# Patient Record
Sex: MALE | Race: WHITE | RURAL
[De-identification: names, ages, dates, MRNs, and addresses within clinical notes are randomized per-mention and may not be internally consistent; named-entity substitution may affect disease eponyms.]

---

## 2020-06-23 ENCOUNTER — OFFICE VISIT (OUTPATIENT)
Dept: RURAL MEDICAL CENTER 4 | Facility: MEDICAL CENTER | Age: 60
End: 2020-06-23
Payer: MEDICARE

## 2020-06-23 DIAGNOSIS — K31.7 BENIGN GASTRIC POLYP: ICD-10-CM

## 2020-06-23 DIAGNOSIS — K64.8 BLEEDING INTERNAL HEMORRHOIDS: ICD-10-CM

## 2020-06-23 DIAGNOSIS — K29.40 ATROPHIC GASTRITIS: ICD-10-CM

## 2020-06-23 PROCEDURE — 43239 EGD BIOPSY SINGLE/MULTIPLE: CPT | Performed by: INTERNAL MEDICINE

## 2020-06-23 PROCEDURE — 45350 SGMDSC W/BAND LIGATION: CPT | Performed by: INTERNAL MEDICINE

## 2020-08-27 ENCOUNTER — OFFICE VISIT (OUTPATIENT)
Dept: URBAN - METROPOLITAN AREA CLINIC 54 | Facility: CLINIC | Age: 60
End: 2020-08-27
Payer: MEDICARE

## 2020-08-27 DIAGNOSIS — K21.9 GERD: ICD-10-CM

## 2020-08-27 DIAGNOSIS — K64.9 HEMORRHOIDS: ICD-10-CM

## 2020-08-27 PROCEDURE — 99214 OFFICE O/P EST MOD 30 MIN: CPT | Performed by: INTERNAL MEDICINE

## 2020-08-27 RX ORDER — HYDROCORTISONE ACETATE 25 MG/1
1 SUPPOSITORY SUPPOSITORY RECTAL TWICE A DAY
Qty: 28 | Refills: 0 | OUTPATIENT
Start: 2020-08-27 | End: 2020-09-10

## 2020-08-27 RX ORDER — PANTOPRAZOLE SODIUM 40 MG/1
TAKE 1 TABLET (40 MG) BY ORAL ROUTE ONCE DAILY TABLET, DELAYED RELEASE ORAL 1
Qty: 90 | Refills: 3 | Status: ACTIVE | COMMUNITY
Start: 2020-04-24

## 2020-08-27 NOTE — HPI-TODAY'S VISIT:
This is a follow-up appointment for this patient, a 60 year old /White male, after a previous visit on 01/31/2019 , for an evaluation for follow-up. The patient has history of chronic gastroesophageal reflux disease. Several months ago he was starting to have some chest pains. He was admitted to [facility]. Apparently he had a cardiac catheterization. I do not have the results of that. He tells me that it was all okay and there were no blockages. He has been on PPI for quite some time. He is currently on omeprazole 20 mg daily. His thought that his GI tract may be responsible for some of his chest pains. He also has history of hematochezia related to outlet type bleeding from hemorrhoids. He has had banding on several occasions and says that he still intermittently having hematochezia.   Follow Up 8/27/20: Patient presents for routine follow up. He saw Dr. Atkinson in April 2020 and had another band placed on LL column. He was previously banded x 3 in 2018/2019. He continues to have hemorrhoidal symptoms despite banding x 4 . He is bothered by itching, burning and occasional bleeding. No use of NSAID's or blood thinners. EGD showed non-erosive esophagus and small hiatal hernia. He is an avid  which aggravates his symptoms.

## 2020-08-31 ENCOUNTER — TELEPHONE ENCOUNTER (OUTPATIENT)
Dept: URBAN - METROPOLITAN AREA CLINIC 54 | Facility: CLINIC | Age: 60
End: 2020-08-31

## 2020-09-01 ENCOUNTER — TELEPHONE ENCOUNTER (OUTPATIENT)
Dept: URBAN - METROPOLITAN AREA CLINIC 54 | Facility: CLINIC | Age: 60
End: 2020-09-01

## 2020-11-17 ENCOUNTER — TELEPHONE ENCOUNTER (OUTPATIENT)
Dept: URBAN - METROPOLITAN AREA CLINIC 54 | Facility: CLINIC | Age: 60
End: 2020-11-17

## 2020-11-17 RX ORDER — HYDROCORTISONE 25 MG/G
1 APPLICATION CREAM TOPICAL TWICE A DAY
Qty: 28 | Refills: 2
Start: 2020-08-27 | End: 2020-10-08

## 2021-10-21 ENCOUNTER — ERX REFILL RESPONSE (OUTPATIENT)
Dept: URBAN - METROPOLITAN AREA CLINIC 54 | Facility: CLINIC | Age: 61
End: 2021-10-21

## 2021-10-21 RX ORDER — HYDROCORTISONE 25 MG/G
APPLY RECTALLY TO THE AFFECTED AREA TWICE DAILY FOR 14 DAYS CREAM TOPICAL
Qty: 30 GRAM | Refills: 0 | OUTPATIENT

## 2021-10-21 RX ORDER — HYDROCORTISONE 25 MG/G
APPLY RECTALLY TO THE AFFECTED AREA TWICE DAILY FOR 14 DAYS CREAM TOPICAL
Qty: 30 GRAM | Refills: 1 | OUTPATIENT

## 2022-02-14 ENCOUNTER — ERX REFILL RESPONSE (OUTPATIENT)
Dept: URBAN - METROPOLITAN AREA CLINIC 54 | Facility: CLINIC | Age: 62
End: 2022-02-14

## 2022-02-14 RX ORDER — HYDROCORTISONE 25 MG/G
APPLY RECTALLY TO THE AFFECTED AREA TWICE DAILY FOR 14 DAYS CREAM TOPICAL
Qty: 30 GRAM | Refills: 1 | OUTPATIENT

## 2022-02-14 RX ORDER — HYDROCORTISONE 25 MG/G
APPLY RECTALLY TO THE AFFECTED AREA TWICE DAILY FOR 14 TO 15 DAYS CREAM TOPICAL
Qty: 30 GRAM | Refills: 1 | OUTPATIENT

## 2022-10-17 ENCOUNTER — OFFICE VISIT (OUTPATIENT)
Dept: URBAN - METROPOLITAN AREA CLINIC 54 | Facility: CLINIC | Age: 62
End: 2022-10-17
Payer: MEDICARE

## 2022-10-17 ENCOUNTER — WEB ENCOUNTER (OUTPATIENT)
Dept: URBAN - METROPOLITAN AREA CLINIC 54 | Facility: CLINIC | Age: 62
End: 2022-10-17

## 2022-10-17 ENCOUNTER — DASHBOARD ENCOUNTERS (OUTPATIENT)
Age: 62
End: 2022-10-17

## 2022-10-17 VITALS
WEIGHT: 218 LBS | TEMPERATURE: 97.6 F | HEART RATE: 55 BPM | SYSTOLIC BLOOD PRESSURE: 103 MMHG | BODY MASS INDEX: 29.53 KG/M2 | DIASTOLIC BLOOD PRESSURE: 62 MMHG | HEIGHT: 72 IN

## 2022-10-17 DIAGNOSIS — K21.9 GERD: ICD-10-CM

## 2022-10-17 DIAGNOSIS — K64.9 HEMORRHOIDS: ICD-10-CM

## 2022-10-17 DIAGNOSIS — Z86.010 PERSONAL HISTORY OF COLONIC POLYPS: ICD-10-CM

## 2022-10-17 PROBLEM — 235595009 GASTROESOPHAGEAL REFLUX DISEASE: Status: ACTIVE | Noted: 2022-10-17

## 2022-10-17 PROBLEM — 428283002: Status: ACTIVE | Noted: 2022-10-17

## 2022-10-17 PROCEDURE — 99213 OFFICE O/P EST LOW 20 MIN: CPT | Performed by: INTERNAL MEDICINE

## 2022-10-17 RX ORDER — CYCLOBENZAPRINE HYDROCHLORIDE 10 MG/1
1 TABLET AT BEDTIME AS NEEDED TABLET, FILM COATED ORAL ONCE A DAY
Status: ACTIVE | COMMUNITY

## 2022-10-17 RX ORDER — ISOSORBIDE MONONITRATE 30 MG/1
1 TABLET IN THE MORNING TABLET, EXTENDED RELEASE ORAL ONCE A DAY
Status: ACTIVE | COMMUNITY

## 2022-10-17 RX ORDER — HYDROCODONE BITARTRATE AND ACETAMINOPHEN 7.5; 325 MG/1; MG/1
1 TABLET AS NEEDED TABLET ORAL
Status: ACTIVE | COMMUNITY

## 2022-10-17 RX ORDER — CELECOXIB 100 MG/1
1 CAPSULE WITH FOOD CAPSULE ORAL ONCE A DAY
Status: ACTIVE | COMMUNITY

## 2022-10-17 RX ORDER — TAMSULOSIN HYDROCHLORIDE 0.4 MG/1
1 CAPSULE CAPSULE ORAL ONCE A DAY
Status: ACTIVE | COMMUNITY

## 2022-10-17 RX ORDER — PRAMIPEXOLE DIHYDROCHLORIDE 0.5 MG/1
1 TABLET TABLET ORAL ONCE A DAY
Status: ACTIVE | COMMUNITY

## 2022-10-17 RX ORDER — HYDROCORTISONE 25 MG/G
1 APPLICATION CREAM TOPICAL TWICE A DAY
Status: ACTIVE | COMMUNITY

## 2022-10-17 RX ORDER — ATORVASTATIN CALCIUM 40 MG/1
1 TABLET TABLET, FILM COATED ORAL ONCE A DAY
Status: ACTIVE | COMMUNITY

## 2022-10-17 RX ORDER — OXYBUTYNIN CHLORIDE 5 MG/1
1 TABLET TABLET ORAL TWICE A DAY
Status: ACTIVE | COMMUNITY

## 2022-10-17 RX ORDER — LEVOCETIRIZINE DIHYDROCHLORIDE 5 MG/1
1 TABLET IN THE EVENING TABLET, FILM COATED ORAL ONCE A DAY
Status: ACTIVE | COMMUNITY

## 2022-10-17 RX ORDER — SODIUM SULFATE, MAGNESIUM SULFATE, AND POTASSIUM CHLORIDE 17.75; 2.7; 2.25 G/1; G/1; G/1
12 TABLETS TABLET ORAL
Qty: 24 TABLETS | Refills: 0 | OUTPATIENT
Start: 2022-10-17 | End: 2022-10-18

## 2022-10-17 RX ORDER — OMEPRAZOLE 20 MG/1
1 CAPSULE 30 MINUTES BEFORE MORNING MEAL CAPSULE, DELAYED RELEASE ORAL ONCE A DAY
Status: ACTIVE | COMMUNITY

## 2022-10-17 NOTE — HPI-TODAY'S VISIT:
This is a follow-up appointment for this patient, a 60 year old /White male, after a previous visit on 01/31/2019 , for an evaluation for follow-up. The patient has history of chronic gastroesophageal reflux disease. Several months ago he was starting to have some chest pains. He was admitted to [facility]. Apparently he had a cardiac catheterization. I do not have the results of that. He tells me that it was all okay and there were no blockages. He has been on PPI for quite some time. He is currently on omeprazole 20 mg daily. His thought that his GI tract may be responsible for some of his chest pains. He also has history of hematochezia related to outlet type bleeding from hemorrhoids. He has had banding on several occasions and says that he still intermittently having hematochezia.   Follow Up 8/27/20: Patient presents for routine follow up. He saw Dr. Atkinson in April 2020 and had another band placed on LL column. He was previously banded x 3 in 2018/2019. He continues to have hemorrhoidal symptoms despite banding x 4 . He is bothered by itching, burning and occasional bleeding. No use of NSAID's or blood thinners. EGD showed non-erosive esophagus and small hiatal hernia. He is an avid  which aggravates his symptoms.  Follow Up 10/17/22: Patient presents for routine follow up. Last colonoscopy was in 2018 with no polyps removed. He has previous history of polyps. He is using Anusol for hemorrhoids PRN.

## 2022-10-18 ENCOUNTER — TELEPHONE ENCOUNTER (OUTPATIENT)
Dept: URBAN - METROPOLITAN AREA CLINIC 54 | Facility: CLINIC | Age: 62
End: 2022-10-18

## 2022-10-25 ENCOUNTER — OFFICE VISIT (OUTPATIENT)
Dept: URBAN - METROPOLITAN AREA SURGERY CENTER 14 | Facility: SURGERY CENTER | Age: 62
End: 2022-10-25

## 2022-12-02 ENCOUNTER — TELEPHONE ENCOUNTER (OUTPATIENT)
Dept: URBAN - METROPOLITAN AREA CLINIC 23 | Facility: CLINIC | Age: 62
End: 2022-12-02

## 2023-01-03 ENCOUNTER — OFFICE VISIT (OUTPATIENT)
Dept: URBAN - METROPOLITAN AREA SURGERY CENTER 14 | Facility: SURGERY CENTER | Age: 63
End: 2023-01-03